# Patient Record
Sex: MALE | Race: WHITE | NOT HISPANIC OR LATINO | Employment: STUDENT | RURAL
[De-identification: names, ages, dates, MRNs, and addresses within clinical notes are randomized per-mention and may not be internally consistent; named-entity substitution may affect disease eponyms.]

---
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
## 2024-12-18 ENCOUNTER — TELEMEDICINE (OUTPATIENT)
Dept: FAMILY MEDICINE CLINIC | Facility: TELEHEALTH | Age: 19
End: 2024-12-18

## 2024-12-18 ENCOUNTER — E-VISIT (OUTPATIENT)
Dept: ADMINISTRATIVE | Facility: OTHER | Age: 19
End: 2024-12-18

## 2024-12-18 DIAGNOSIS — R06.2 WHEEZING: ICD-10-CM

## 2024-12-18 DIAGNOSIS — J06.9 VIRAL URI: Primary | ICD-10-CM

## 2024-12-18 PROCEDURE — 99213 OFFICE O/P EST LOW 20 MIN: CPT | Performed by: NURSE PRACTITIONER

## 2024-12-18 RX ORDER — GUAIFENESIN 600 MG/1
1200 TABLET, EXTENDED RELEASE ORAL 2 TIMES DAILY
Qty: 20 TABLET | Refills: 0 | Status: SHIPPED | OUTPATIENT
Start: 2024-12-18 | End: 2024-12-23

## 2024-12-18 RX ORDER — BROMPHENIRAMINE MALEATE, PSEUDOEPHEDRINE HYDROCHLORIDE, AND DEXTROMETHORPHAN HYDROBROMIDE 2; 30; 10 MG/5ML; MG/5ML; MG/5ML
10 SYRUP ORAL 4 TIMES DAILY PRN
Qty: 240 ML | Refills: 0 | Status: SHIPPED | OUTPATIENT
Start: 2024-12-18

## 2024-12-18 RX ORDER — PREDNISONE 20 MG/1
20 TABLET ORAL 2 TIMES DAILY
Qty: 10 TABLET | Refills: 0 | Status: SHIPPED | OUTPATIENT
Start: 2024-12-18 | End: 2024-12-23

## 2024-12-18 NOTE — E-VISIT ESCALATED
"Status: Referred Out  Date: 2024 16:03:59  Acuity Level: Within 8 hours  Referral message:  We're sorry you are not feeling well. Your safety is important to us. A provider needs to see the rash visually in order to safely diagnose and treat you. The rashes you have could indicate many conditions, from minor to very serious.    For the most appropriate care, please be seen:   At a clinic or an urgent care  Within 8 hours   You won't be charged for this visit. We hope you feel better soon!   Patient: Tristen Huang  Patient : 2005  Patient Address[place] [address] [place] [address]  Patient Phone: [phone]  Clinician Response: Unavailable  Diagnosis: Unavailable  Diagnosis ICD: Unavailable     Patient Interview Questions and Responses:  Clinical Protocol: URI  Please select the reason for your visit today.: Cold, sinus infection, or flu  Please select when your first symptom started.: 3-5 days ago  Knowing exactly when the symptoms started will help the provider diagnose and treat the condition appropriately.Please enter the date your symptoms started (MM/DD/YYYY). If you do not know the exact date, please enter \"unknown\".: 2024  How quickly did your symptoms start?: Suddenly (symptoms started at once)  After your symptoms started, did they improve and then get worse again?: No  Do you currently have these symptoms? Nasal congestion (stuffy nose): Yes  Do you currently have these symptoms? Runny nose: No  Do you currently have these symptoms? Cough: Yes  Do you currently have these symptoms? Sore throat: Yes  Color helps provide description to the provider but it does not help decide if the condition is viral or bacterial or if antibiotics will be prescribed.What color is your mucus? Select all that apply. Clear: Yes  Color helps provide description to the provider but it does not help decide if the condition is viral or bacterial or if antibiotics will be prescribed.What color is " your mucus? Select all that apply. White: Yes  Color helps provide description to the provider but it does not help decide if the condition is viral or bacterial or if antibiotics will be prescribed.What color is your mucus? Select all that apply. Yellow: No  Color helps provide description to the provider but it does not help decide if the condition is viral or bacterial or if antibiotics will be prescribed.What color is your mucus? Select all that apply. Green: No  Color helps provide description to the provider but it does not help decide if the condition is viral or bacterial or if antibiotics will be prescribed.What color is your mucus? Select all that apply. Blood-tinged: No  Color helps provide description to the provider but it does not help decide if the condition is viral or bacterial or if antibiotics will be prescribed.What color is your mucus? Select all that apply. Bloody: No  How often do you cough?: Every 5-10 minutes  Is the cough more bothersome at night?: Yes  Does anything (phlegm) come up into the throat when you cough?: Yes  Do you think the cough is caused by post-nasal drip (the phlegm going down the back of the throat)?: Yes  What is the color of your phlegm? Select all that apply. Clear: Yes  What is the color of your phlegm? Select all that apply. White: Yes  What is the color of your phlegm? Select all that apply. Yellow: No  What is the color of your phlegm? Select all that apply. Green: No  What is the color of your phlegm? Select all that apply. Blood-tinged: No  What is the color of your phlegm? Select all that apply. Bloody: No  Please rate the severity of your throat pain on a pain scale, with 0 being no pain and 10 being the worst pain imaginable.: 1-3 = Mild pain  Being unable to swallow any liquids, including one's own saliva, may indicate a very serious condition called epiglottitis. Please be seen in the emergency room if liquids cannot be swallowed. Can you swallow liquids?:  Yes, I can swallow liquids without   any difficulty  Please feel your neck. Are the lymph nodes in the neck enlarged?: Not sure  Do you have white spots on the back of the throat?: No  Has a rash appeared on your skin since the sore throat started?: Yes  Within the past week, have you been near anyone that has strep throat?: No  Do you currently have these symptoms? Achiness: Yes  Do you currently have these symptoms? Feeling run-down or sick: Yes  Do you currently have these symptoms? Chills: No  Do you feel feverish?: No  Do you currently have difficulty breathing?: No  Wheezing is a high-pitched sound that comes from the chest while breathing. It is commonly described as a whistling sound, similar to wind blowing through a tunnel.Are you wheezing?: Yes  Does your wheezing impact your daily activities?: Never   A pulse oximeter is a device that slides over the fingertip to measure the level of oxygen in blood.  Do you have an oximeter at home?: No  Do you currently have these symptoms? Facial pain or pressure: No  Do you currently have these symptoms? Headache: No  Do you currently have these symptoms? Tooth pain: No  Do you currently have these symptoms? Diarrhea: Yes  Do you currently have these symptoms? Nausea: Yes  Do you currently have these symptoms? Vomiting: No  Do you currently have these symptoms? New loss of smell or taste: No  Do you currently have ear pain?: No  Have you taken an antibiotic for similar symptoms in the past month?: No  Have you recently been exposed to someone with the flu?: No  Have you received the flu vaccine this season?: No  It is important for us to know if you are in close contact with anyone who is at high risk for developing complications from the flu. Pregnant women: No  It is important for us to know if you are in close contact with anyone who is at high risk for developing complications from the flu. Children under the age of 5: No  It is important for us to know if you  are in close contact with anyone who is at high risk for developing complications from the flu. Adults 65 or older: No  It is important for us to know if you are in close contact with anyone who is at high risk for developing complications from the flu. People with asthma, heart disease, or diabetes: No  It is important for us to know if you are in close contact with anyone who is at high risk for developing complications from the flu. People with a weakened immune system (e.g. cancer, HIV, AIDS): No  It is important for us to know if you are in close contact with anyone who is at high risk for developing complications from the flu. None of the above: Yes  Since your symptoms started, have you tested for COVID-19?: No  Have you had COVID-19 in the last 3 months?: No  In the past year, have you had the COVID-19 vaccine?: No  Some people with upper respiratory infections take nonsteroidal anti-inflammatory drugs (NSAIDs) such as ibuprofen and naproxen for relief of pain and fever, but those with chronic kidney disease should not take them.Have you been told by a provider to   avoid NSAIDs?: No  Do any of the following immunosuppressive treatments or conditions apply to you? Select all that apply. Chemotherapy: No  Do any of the following immunosuppressive treatments or conditions apply to you? Select all that apply. HIV: No  Do any of the following immunosuppressive treatments or conditions apply to you? Select all that apply. Organ transplant: No  Do any of the following immunosuppressive treatments or conditions apply to you? Select all that apply. Long-term use of steroids or other immunosuppressive medications: No  Do any of the following immunosuppressive treatments or conditions apply to you? Select all that apply. Splenectomy (surgery to remove the spleen): No  Do any of the following immunosuppressive treatments or conditions apply to you? Select all that apply. Other immunosuppressive condition: No  Do any of  the following immunosuppressive treatments or conditions apply to you? Select all that apply. None of the above: Yes  Do you have diabetes?: No  Do you have asthma?: No  People with certain medical conditions are more likely to get very sick with COVID-19.Do you have any of the following medical conditions? Chronic lung disease: No  People with certain medical conditions are more likely to get very sick with COVID-19.Do you have any of the following medical conditions? Cystic fibrosis: No  People with certain medical conditions are more likely to get very sick with COVID-19.Do you have any of the following medical conditions? Hypertension: No  People with certain medical conditions are more likely to get very sick with COVID-19.Do you have any of the following medical conditions? Long-term disabilities: No  People with certain medical conditions are more likely to get very sick with COVID-19.Do you have any of the following medical conditions? Mental health conditions: No  People with certain medical conditions are more likely to get very sick with COVID-19.Do you have any of the following medical conditions? Sickle cell disease or thalassemia: No  People with certain medical conditions are more likely to get very sick with COVID-19.Do you have any of the following medical conditions? Stroke or other cerebrovascular disease: No  People with certain medical conditions are more likely to get very sick with COVID-19.Do you have any of the following medical conditions? Substance use disorders: No  People with certain medical conditions are more likely to get very sick with COVID-19.Do you have any of the following medical conditions? Tuberculosis (TB): No  People with certain medical conditions are more likely to get very sick with COVID-19.Do you have any of the following medical conditions? None of the above: Yes  In the past 60 days, have you had sinus surgery?: No  Do you need a return to work/school note? (This  "note will not include personal medical information.): No  Do you smoke or use smokeless tobacco?: No  Do you vape or use other e-cigarette products?: No  Current medications: No  Medication allergies: No  Please enter your current weight (in lbs). The provider will use the weight entered for medication dosing, so accuracy is important.: 150  Before ending the interview, is there any additional information you would like to share about the following? Medications you have already tried and effectiveness        Symptoms        Recent changes in health Other related health conditions or relevant   information about this visit Select \"yes\" to enter additional information.: Yes  Please enter the additional information below:: N/A  Almost done! Because a rash was developed on the skin after sore throat started, photos of the affected area(s) are required in order to diagnose and treat you.Can photos be taken and uploaded at this time?: No  " no

## 2024-12-18 NOTE — PATIENT INSTRUCTIONS
Continue treating symptoms.  Alternate tylenol and motrin for pain and/or fever, stay hydrated and rest.     If symptoms worsen or do not improve follow up with your PCP or visit your nearest Urgent Care Center or ER.

## 2024-12-18 NOTE — PROGRESS NOTES
Subjective   Chief Complaint   Patient presents with    URI       Tristen Huang is a 19 y.o. male.     History of Present Illness  Patient reports nausea, vomiting, diarrhea and cough that started about 4 days ago.  Vomiting resolved 2 days ago.  Diarrhea is improving.  He presents today due to cough and wheezing.  Cough is productive with clear and white mucus.  He is usually able to improve wheezing with a deep cough.  URI   This is a new problem. Episode onset: 4 days. The problem has been unchanged. There has been no fever. Associated symptoms include congestion, coughing, diarrhea, nausea and wheezing. Pertinent negatives include no abdominal pain, chest pain, dysuria, ear pain, plugged ear sensation, sore throat or vomiting. Treatments tried: immodium.        Allergies   Allergen Reactions    Nuts Anaphylaxis    Other Anaphylaxis     Green peas       History reviewed. No pertinent past medical history.    Past Surgical History:   Procedure Laterality Date    ELBOW PROCEDURE Right     WISDOM TOOTH EXTRACTION         Social History     Socioeconomic History    Marital status: Single   Tobacco Use    Smoking status: Never    Smokeless tobacco: Never       History reviewed. No pertinent family history.      Current Outpatient Medications:     brompheniramine-pseudoephedrine-DM 30-2-10 MG/5ML syrup, Take 10 mL by mouth 4 (Four) Times a Day As Needed for Congestion or Cough., Disp: 240 mL, Rfl: 0    guaiFENesin (Mucinex) 600 MG 12 hr tablet, Take 2 tablets by mouth 2 (Two) Times a Day for 5 days., Disp: 20 tablet, Rfl: 0    predniSONE (DELTASONE) 20 MG tablet, Take 1 tablet by mouth 2 (Two) Times a Day for 5 days., Disp: 10 tablet, Rfl: 0      Review of Systems   Constitutional:  Positive for activity change and fatigue. Negative for chills, diaphoresis and fever.   HENT:  Positive for congestion. Negative for ear pain and sore throat.    Respiratory:  Positive for cough and wheezing. Negative for chest tightness and  shortness of breath.    Cardiovascular:  Negative for chest pain.   Gastrointestinal:  Positive for diarrhea and nausea. Negative for abdominal pain and vomiting.   Genitourinary:  Negative for dysuria.   Musculoskeletal:  Negative for myalgias.   Neurological:  Negative for headache.        There were no vitals filed for this visit.    Objective   Physical Exam  Constitutional:       General: He is not in acute distress.     Appearance: Normal appearance. He is not ill-appearing, toxic-appearing or diaphoretic.   HENT:      Head: Normocephalic.      Nose: Nose normal.      Mouth/Throat:      Lips: Pink.      Mouth: Mucous membranes are moist.   Pulmonary:      Effort: Pulmonary effort is normal.   Neurological:      Mental Status: He is alert and oriented to person, place, and time.          Procedures     Assessment & Plan   Diagnoses and all orders for this visit:    1. Viral URI (Primary)  -     brompheniramine-pseudoephedrine-DM 30-2-10 MG/5ML syrup; Take 10 mL by mouth 4 (Four) Times a Day As Needed for Congestion or Cough.  Dispense: 240 mL; Refill: 0  -     guaiFENesin (Mucinex) 600 MG 12 hr tablet; Take 2 tablets by mouth 2 (Two) Times a Day for 5 days.  Dispense: 20 tablet; Refill: 0    2. Wheezing  -     predniSONE (DELTASONE) 20 MG tablet; Take 1 tablet by mouth 2 (Two) Times a Day for 5 days.  Dispense: 10 tablet; Refill: 0            PLAN: Discussed dosing, side effects, recommended other symptomatic care.  Patient should follow up with primary care provider, Urgent Care or ER if symptoms worsen, fail to resolve or other symptoms need attention. Patient/family agree to the above.         RADHA Silva     Mode of Visit: Video  Location of patient: -HOME-  Location of provider: +HOME+  You have chosen to receive care through a telehealth visit.  The patient has signed the video visit consent form.  The visit included audio and video interaction. No technical issues occurred during this  visit.    The use of a video visit has been reviewed with the patient and verbal informed consent has been obtained. Myself and Tristen Huang participated in this visit. The patient is located at [address] [place], [place] [id number]. I am located in [place], [place]. [employer] and [employer] were utilized.        This visit was performed via Telehealth.  This patient has been instructed to follow-up with their primary care provider if their symptoms worsen or the treatment provided does not resolve their illness.